# Patient Record
Sex: FEMALE | Race: WHITE | Employment: FULL TIME | URBAN - METROPOLITAN AREA
[De-identification: names, ages, dates, MRNs, and addresses within clinical notes are randomized per-mention and may not be internally consistent; named-entity substitution may affect disease eponyms.]

---

## 2024-06-18 ENCOUNTER — OFFICE VISIT (OUTPATIENT)
Age: 54
End: 2024-06-18

## 2024-06-18 VITALS
TEMPERATURE: 99.7 F | WEIGHT: 144 LBS | BODY MASS INDEX: 23.14 KG/M2 | RESPIRATION RATE: 18 BRPM | SYSTOLIC BLOOD PRESSURE: 121 MMHG | HEART RATE: 91 BPM | HEIGHT: 66 IN | OXYGEN SATURATION: 95 % | DIASTOLIC BLOOD PRESSURE: 82 MMHG

## 2024-06-18 DIAGNOSIS — J06.9 UPPER RESPIRATORY TRACT INFECTION, UNSPECIFIED TYPE: ICD-10-CM

## 2024-06-18 DIAGNOSIS — J01.00 ACUTE NON-RECURRENT MAXILLARY SINUSITIS: Primary | ICD-10-CM

## 2024-06-18 RX ORDER — METHYLPREDNISOLONE 4 MG/1
TABLET ORAL
Qty: 1 KIT | Refills: 0 | Status: SHIPPED | OUTPATIENT
Start: 2024-06-18 | End: 2024-06-24

## 2024-06-18 RX ORDER — ALBUTEROL SULFATE 90 UG/1
1 AEROSOL, METERED RESPIRATORY (INHALATION) EVERY 6 HOURS PRN
Qty: 18 G | Refills: 3 | Status: SHIPPED | OUTPATIENT
Start: 2024-06-18

## 2024-06-18 RX ORDER — AMOXICILLIN AND CLAVULANATE POTASSIUM 875; 125 MG/1; MG/1
1 TABLET, FILM COATED ORAL 2 TIMES DAILY
Qty: 20 TABLET | Refills: 0 | Status: SHIPPED | OUTPATIENT
Start: 2024-06-18 | End: 2024-06-28

## 2024-06-19 NOTE — PATIENT INSTRUCTIONS
Acute bacterial sinusitis in setting of upper respiratory infection -  Augmentin twice daily for 10 days  Medrol dose pack as directed  Continue over the counter cold remedies, recommend taking an antihistamine and decongestant such as Allegra-D  Albuterol inhaler as needed  Drink plenty fluids  Call or return to clinic if no improvement or any worsening

## 2024-06-19 NOTE — PROGRESS NOTES
Corinne Stanford (:  1970) is a 53 y.o. female,New patient, here for evaluation of the following chief complaint(s):  Pharyngitis (Sxs been going on for a week sinus congestion, sore throat, cough. Hx of sinus infection. Lingering cough, chest tightness when coughing, chest congestion )        SUBJECTIVE/OBJECTIVE:    History provided by:  Patient  Pharyngitis       53 y.o. female presents with symptoms of sinus pressure and pain, tooth pain, ear pressure, heavy post-nasal drip, and cough. Using Mucinex over the counter and feels like draining a lot but not necessarily getting anything up. No sore throat, only irritation from post-nasal drip. No history of asthma but as needed an inhaler for bronchitis in the past and it was helpful. She needs a refill of albuterol. No fever, chills.         Vitals:    24 1921   BP: 121/82   Site: Right Upper Arm   Position: Sitting   Cuff Size: Medium Adult   Pulse: 91   Resp: 18   Temp: 99.7 °F (37.6 °C)   TempSrc: Oral   SpO2: 95%   Weight: 65.3 kg (144 lb)   Height: 1.676 m (5' 6\")       No results found for this visit on 24.     Physical Exam  Constitutional:       General: She is not in acute distress.     Appearance: Normal appearance. She is not ill-appearing or toxic-appearing.   HENT:      Head: Normocephalic and atraumatic.      Right Ear: Tympanic membrane, ear canal and external ear normal.      Left Ear: Tympanic membrane, ear canal and external ear normal.      Nose: Congestion present.      Comments: Ethmoid and maxillary sinus tenderness.     Mouth/Throat:      Mouth: Mucous membranes are moist.      Pharynx: Posterior oropharyngeal erythema present. No oropharyngeal exudate.   Eyes:      General:         Right eye: No discharge.         Left eye: No discharge.      Conjunctiva/sclera: Conjunctivae normal.   Cardiovascular:      Rate and Rhythm: Normal rate and regular rhythm.      Heart sounds: Normal heart sounds. No murmur heard.     No